# Patient Record
Sex: FEMALE | Race: WHITE | NOT HISPANIC OR LATINO
[De-identification: names, ages, dates, MRNs, and addresses within clinical notes are randomized per-mention and may not be internally consistent; named-entity substitution may affect disease eponyms.]

---

## 2021-02-27 ENCOUNTER — APPOINTMENT (OUTPATIENT)
Dept: DISASTER EMERGENCY | Facility: CLINIC | Age: 71
End: 2021-02-27

## 2021-02-27 PROBLEM — Z00.00 ENCOUNTER FOR PREVENTIVE HEALTH EXAMINATION: Status: ACTIVE | Noted: 2021-02-27

## 2021-02-28 LAB — SARS-COV-2 N GENE NPH QL NAA+PROBE: NOT DETECTED

## 2021-03-13 ENCOUNTER — APPOINTMENT (OUTPATIENT)
Dept: DISASTER EMERGENCY | Facility: CLINIC | Age: 71
End: 2021-03-13

## 2021-10-02 ENCOUNTER — APPOINTMENT (OUTPATIENT)
Dept: DISASTER EMERGENCY | Facility: CLINIC | Age: 71
End: 2021-10-02

## 2021-10-14 ENCOUNTER — OUTPATIENT (OUTPATIENT)
Dept: OUTPATIENT SERVICES | Facility: HOSPITAL | Age: 71
LOS: 1 days | End: 2021-10-14
Payer: MEDICARE

## 2021-10-14 PROCEDURE — 93010 ELECTROCARDIOGRAM REPORT: CPT

## 2021-10-24 DIAGNOSIS — Z01.818 ENCOUNTER FOR OTHER PREPROCEDURAL EXAMINATION: ICD-10-CM

## 2021-10-25 ENCOUNTER — APPOINTMENT (OUTPATIENT)
Dept: DISASTER EMERGENCY | Facility: CLINIC | Age: 71
End: 2021-10-25

## 2021-10-26 LAB — SARS-COV-2 N GENE NPH QL NAA+PROBE: NOT DETECTED

## 2021-10-28 ENCOUNTER — OUTPATIENT (OUTPATIENT)
Dept: OUTPATIENT SERVICES | Facility: HOSPITAL | Age: 71
LOS: 1 days | End: 2021-10-28

## 2022-01-11 ENCOUNTER — APPOINTMENT (OUTPATIENT)
Dept: CT IMAGING | Facility: CLINIC | Age: 72
End: 2022-01-11
Payer: MEDICARE

## 2022-01-11 PROCEDURE — 82565 ASSAY OF CREATININE: CPT | Mod: QW

## 2022-01-11 PROCEDURE — 74177 CT ABD & PELVIS W/CONTRAST: CPT | Mod: MH

## 2022-01-20 ENCOUNTER — NON-APPOINTMENT (OUTPATIENT)
Age: 72
End: 2022-01-20

## 2022-01-20 ENCOUNTER — APPOINTMENT (OUTPATIENT)
Dept: OPHTHALMOLOGY | Facility: CLINIC | Age: 72
End: 2022-01-20
Payer: MEDICARE

## 2022-01-20 PROCEDURE — 92004 COMPRE OPH EXAM NEW PT 1/>: CPT

## 2022-02-10 ENCOUNTER — APPOINTMENT (OUTPATIENT)
Dept: OPHTHALMOLOGY | Facility: CLINIC | Age: 72
End: 2022-02-10

## 2022-02-14 ENCOUNTER — APPOINTMENT (OUTPATIENT)
Dept: OPHTHALMOLOGY | Facility: CLINIC | Age: 72
End: 2022-02-14
Payer: MEDICARE

## 2022-02-14 ENCOUNTER — NON-APPOINTMENT (OUTPATIENT)
Age: 72
End: 2022-02-14

## 2022-02-14 PROCEDURE — 92134 CPTRZ OPH DX IMG PST SGM RTA: CPT

## 2022-02-14 PROCEDURE — 99214 OFFICE O/P EST MOD 30 MIN: CPT

## 2022-08-15 ENCOUNTER — NON-APPOINTMENT (OUTPATIENT)
Age: 72
End: 2022-08-15

## 2022-08-15 ENCOUNTER — APPOINTMENT (OUTPATIENT)
Dept: OPHTHALMOLOGY | Facility: CLINIC | Age: 72
End: 2022-08-15

## 2022-08-15 PROCEDURE — 99214 OFFICE O/P EST MOD 30 MIN: CPT

## 2022-08-15 PROCEDURE — 92134 CPTRZ OPH DX IMG PST SGM RTA: CPT

## 2022-12-28 ENCOUNTER — OFFICE (OUTPATIENT)
Dept: URBAN - METROPOLITAN AREA CLINIC 103 | Facility: CLINIC | Age: 72
Setting detail: OPHTHALMOLOGY
End: 2022-12-28
Payer: MEDICARE

## 2022-12-28 DIAGNOSIS — H35.363: ICD-10-CM

## 2022-12-28 DIAGNOSIS — H35.3221: ICD-10-CM

## 2022-12-28 DIAGNOSIS — H35.3231: ICD-10-CM

## 2022-12-28 DIAGNOSIS — H53.62: ICD-10-CM

## 2022-12-28 PROCEDURE — 92134 CPTRZ OPH DX IMG PST SGM RTA: CPT | Performed by: SPECIALIST

## 2022-12-28 PROCEDURE — 67028 INJECTION EYE DRUG: CPT | Performed by: SPECIALIST

## 2022-12-28 ASSESSMENT — REFRACTION_AUTOREFRACTION
OD_AXIS: 113
OS_AXIS: 071
OS_SPHERE: +1.25
OD_CYLINDER: -0.50
OD_SPHERE: PLANO
OS_CYLINDER: -0.75

## 2022-12-28 ASSESSMENT — CONFRONTATIONAL VISUAL FIELD TEST (CVF)
OS_FINDINGS: FULL
OD_FINDINGS: FULL

## 2022-12-28 ASSESSMENT — KERATOMETRY
OD_K2POWER_DIOPTERS: 44.75
OS_K2POWER_DIOPTERS: 45.00
OS_K1POWER_DIOPTERS: 45.00
OD_AXISANGLE_DEGREES: 090
OD_K1POWER_DIOPTERS: 44.75
METHOD_AUTO_MANUAL: MANUAL
OS_AXISANGLE_DEGREES: 090

## 2022-12-28 ASSESSMENT — VISUAL ACUITY
OD_BCVA: 20/70
OS_BCVA: 20/30-2

## 2022-12-28 ASSESSMENT — CORNEAL EDEMA CLINICAL DESCRIPTION: OD_CORNEALEDEMA: ABSENT

## 2022-12-28 ASSESSMENT — SPHEQUIV_DERIVED: OS_SPHEQUIV: 0.875

## 2022-12-28 ASSESSMENT — TONOMETRY
OS_IOP_MMHG: 13
OD_IOP_MMHG: 13

## 2022-12-28 ASSESSMENT — AXIALLENGTH_DERIVED: OS_AL: 22.7325

## 2023-01-25 ENCOUNTER — OFFICE (OUTPATIENT)
Dept: URBAN - METROPOLITAN AREA CLINIC 103 | Facility: CLINIC | Age: 73
Setting detail: OPHTHALMOLOGY
End: 2023-01-25
Payer: MEDICARE

## 2023-01-25 DIAGNOSIS — H35.363: ICD-10-CM

## 2023-01-25 DIAGNOSIS — H53.62: ICD-10-CM

## 2023-01-25 DIAGNOSIS — H35.3231: ICD-10-CM

## 2023-01-25 DIAGNOSIS — H43.811: ICD-10-CM

## 2023-01-25 PROCEDURE — 92235 FLUORESCEIN ANGRPH MLTIFRAME: CPT | Performed by: SPECIALIST

## 2023-01-25 PROCEDURE — 92012 INTRM OPH EXAM EST PATIENT: CPT | Performed by: SPECIALIST

## 2023-01-25 PROCEDURE — 92134 CPTRZ OPH DX IMG PST SGM RTA: CPT | Performed by: SPECIALIST

## 2023-01-25 ASSESSMENT — TONOMETRY
OS_IOP_MMHG: 14
OD_IOP_MMHG: 11

## 2023-01-25 ASSESSMENT — SPHEQUIV_DERIVED: OS_SPHEQUIV: 0.875

## 2023-01-25 ASSESSMENT — KERATOMETRY
OD_AXISANGLE_DEGREES: 090
OD_K2POWER_DIOPTERS: 44.75
OS_K2POWER_DIOPTERS: 45.00
OS_K1POWER_DIOPTERS: 45.00
METHOD_AUTO_MANUAL: MANUAL
OS_AXISANGLE_DEGREES: 090
OD_K1POWER_DIOPTERS: 44.75

## 2023-01-25 ASSESSMENT — VISUAL ACUITY
OD_BCVA: 20/60+
OS_BCVA: 20/30-

## 2023-01-25 ASSESSMENT — CONFRONTATIONAL VISUAL FIELD TEST (CVF)
OD_FINDINGS: FULL
OS_FINDINGS: FULL

## 2023-01-25 ASSESSMENT — REFRACTION_AUTOREFRACTION
OD_AXIS: 113
OS_CYLINDER: -0.75
OD_CYLINDER: -0.50
OS_SPHERE: +1.25
OS_AXIS: 071
OD_SPHERE: PLANO

## 2023-01-25 ASSESSMENT — AXIALLENGTH_DERIVED: OS_AL: 22.7325

## 2023-01-25 ASSESSMENT — CORNEAL EDEMA CLINICAL DESCRIPTION: OD_CORNEALEDEMA: ABSENT

## 2023-01-31 ENCOUNTER — NON-APPOINTMENT (OUTPATIENT)
Age: 73
End: 2023-01-31

## 2023-01-31 ENCOUNTER — APPOINTMENT (OUTPATIENT)
Dept: ENDOCRINOLOGY | Facility: CLINIC | Age: 73
End: 2023-01-31
Payer: MEDICARE

## 2023-01-31 VITALS
OXYGEN SATURATION: 97 % | WEIGHT: 180 LBS | TEMPERATURE: 97.3 F | DIASTOLIC BLOOD PRESSURE: 80 MMHG | HEART RATE: 60 BPM | HEIGHT: 64 IN | RESPIRATION RATE: 14 BRPM | BODY MASS INDEX: 30.73 KG/M2 | SYSTOLIC BLOOD PRESSURE: 130 MMHG

## 2023-01-31 DIAGNOSIS — I10 ESSENTIAL (PRIMARY) HYPERTENSION: ICD-10-CM

## 2023-01-31 DIAGNOSIS — Z87.19 PERSONAL HISTORY OF OTHER DISEASES OF THE DIGESTIVE SYSTEM: ICD-10-CM

## 2023-01-31 DIAGNOSIS — Z86.59 PERSONAL HISTORY OF OTHER MENTAL AND BEHAVIORAL DISORDERS: ICD-10-CM

## 2023-01-31 DIAGNOSIS — I48.91 UNSPECIFIED ATRIAL FIBRILLATION: ICD-10-CM

## 2023-01-31 DIAGNOSIS — Z83.49 FAMILY HISTORY OF OTHER ENDOCRINE, NUTRITIONAL AND METABOLIC DISEASES: ICD-10-CM

## 2023-01-31 DIAGNOSIS — Z86.79 PERSONAL HISTORY OF OTHER DISEASES OF THE CIRCULATORY SYSTEM: ICD-10-CM

## 2023-01-31 DIAGNOSIS — E78.5 HYPERLIPIDEMIA, UNSPECIFIED: ICD-10-CM

## 2023-01-31 PROCEDURE — 99204 OFFICE O/P NEW MOD 45 MIN: CPT

## 2023-01-31 RX ORDER — SIMVASTATIN 20 MG/1
20 TABLET, FILM COATED ORAL DAILY
Refills: 0 | Status: ACTIVE | COMMUNITY

## 2023-01-31 RX ORDER — TRIAMTERENE AND HYDROCHLOROTHIAZIDE 37.5; 25 MG/1; MG/1
37.5-25 CAPSULE ORAL DAILY
Refills: 0 | Status: ACTIVE | COMMUNITY

## 2023-01-31 RX ORDER — METOPROLOL SUCCINATE 25 MG/1
25 TABLET, EXTENDED RELEASE ORAL DAILY
Refills: 0 | Status: ACTIVE | COMMUNITY

## 2023-01-31 RX ORDER — PAROXETINE HYDROCHLORIDE 10 MG/1
10 TABLET, FILM COATED ORAL DAILY
Refills: 0 | Status: ACTIVE | COMMUNITY

## 2023-01-31 RX ORDER — DILTIAZEM HYDROCHLORIDE 120 MG/1
120 CAPSULE, COATED, EXTENDED RELEASE ORAL DAILY
Refills: 0 | Status: ACTIVE | COMMUNITY

## 2023-01-31 RX ORDER — PANTOPRAZOLE 40 MG/1
40 TABLET, DELAYED RELEASE ORAL DAILY
Refills: 0 | Status: ACTIVE | COMMUNITY

## 2023-01-31 RX ORDER — APIXABAN 5 MG/1
5 TABLET, FILM COATED ORAL TWICE DAILY
Refills: 0 | Status: ACTIVE | COMMUNITY

## 2023-01-31 NOTE — HISTORY OF PRESENT ILLNESS
[FreeTextEntry1] : Here to establish care for history of thyroid nodules and recently diagnosed subclinical hyperthyroidism.\par About 10 years ago was found to have several moderate to large sized thyroid nodules.  She had biopsies done with Dr. Holm, which were benign.  She reports that follow up imaging showed smaller nodules, but no recent imaging nodule.  \par \par PMH of Afib, preDM, HTN and HLD. \par \par About one year ago, was found to have low TSH on routine labs.  She was subsequently referred her for work up. \par She denies any symptoms palpitations, tremors or heat intolerance.  No neck pain or dysphagia.

## 2023-01-31 NOTE — ASSESSMENT
[FreeTextEntry1] : 72 year old female with PMH of multinodular goiter s/p benign FNA, Afib, HTN and HLD here to establish endocrine care.  Labs in 2022 showed slightly low TSH level, which could indicated mild subclinical hyperthyroidism versus sick euthyroid syndrome.\par \par 1.  Multinodular goiter-  check thyroid US now.  Will attempt to obtain previous imaging results and FNA results.   Will repeat TFTs now along with thyroid antibodies (including TSI).   If TSH remains suppressed, will likely need thyroid uptake and scan. \par 2.  Prediabetes-   Continue lifestyle modification.  Check A1c now.  \par \par Will determine follow up interval based on review of labs and imaging.

## 2023-01-31 NOTE — REVIEW OF SYSTEMS
[Fatigue] : fatigue [Anxiety] : anxiety [Blurred Vision] : no blurred vision [Dysphagia] : no dysphagia [Neck Pain] : no neck pain [Palpitations] : no palpitations [Shortness Of Breath] : no shortness of breath [Diarrhea] : no diarrhea [Muscle Cramps] : no muscle cramps [Hair Loss] : no hair loss [Tremors] : no tremors [Heat Intolerance] : no heat intolerance

## 2023-01-31 NOTE — PHYSICAL EXAM
[Healthy Appearance] : healthy appearance [No Acute Distress] : no acute distress [Normal Sclera/Conjunctiva] : normal sclera/conjunctiva [EOMI] : extra ocular movement intact [No Proptosis] : no proptosis [No Lid Lag] : no lid lag [No Respiratory Distress] : no respiratory distress [Clear to Auscultation] : lungs were clear to auscultation bilaterally [Normal S1, S2] : normal S1 and S2 [No Murmurs] : no murmurs [Normal Rate] : heart rate was normal [Regular Rhythm] : with a regular rhythm [No Edema] : no peripheral edema [Normal Gait] : normal gait [No Clubbing, Cyanosis] : no clubbing  or cyanosis of the fingernails [No Tremors] : no tremors [Normal Affect] : the affect was normal [Normal Insight/Judgement] : insight and judgment were intact [Normal Mood] : the mood was normal [Acanthosis Nigricans] : no acanthosis nigricans [de-identified] : Thyroid is prominent and irregular in texture.

## 2023-02-07 LAB
ALBUMIN SERPL ELPH-MCNC: 4.6 G/DL
ALP BLD-CCNC: 83 U/L
ALT SERPL-CCNC: 13 U/L
ANION GAP SERPL CALC-SCNC: 10 MMOL/L
AST SERPL-CCNC: 21 U/L
BILIRUB SERPL-MCNC: 0.8 MG/DL
BUN SERPL-MCNC: 17 MG/DL
CALCIUM SERPL-MCNC: 10.3 MG/DL
CHLORIDE SERPL-SCNC: 102 MMOL/L
CO2 SERPL-SCNC: 31 MMOL/L
CREAT SERPL-MCNC: 0.72 MG/DL
EGFR: 88 ML/MIN/1.73M2
ESTIMATED AVERAGE GLUCOSE: 120 MG/DL
GLUCOSE SERPL-MCNC: 93 MG/DL
HBA1C MFR BLD HPLC: 5.8 %
POTASSIUM SERPL-SCNC: 4.9 MMOL/L
PROT SERPL-MCNC: 6.5 G/DL
SODIUM SERPL-SCNC: 144 MMOL/L
T3FREE SERPL-MCNC: 2.68 PG/ML
T4 FREE SERPL-MCNC: 1.3 NG/DL
THYROGLOB AB SERPL-ACNC: <20 IU/ML
THYROPEROXIDASE AB SERPL IA-ACNC: <10 IU/ML
TSH SERPL-ACNC: 0.3 UIU/ML
TSI ACT/NOR SER: <0.1 IU/L

## 2023-02-22 ENCOUNTER — OFFICE (OUTPATIENT)
Dept: URBAN - METROPOLITAN AREA CLINIC 103 | Facility: CLINIC | Age: 73
Setting detail: OPHTHALMOLOGY
End: 2023-02-22
Payer: MEDICARE

## 2023-02-22 VITALS — HEIGHT: 64 IN | WEIGHT: 190 LBS

## 2023-02-22 DIAGNOSIS — H43.811: ICD-10-CM

## 2023-02-22 DIAGNOSIS — H35.363: ICD-10-CM

## 2023-02-22 DIAGNOSIS — H35.3221: ICD-10-CM

## 2023-02-22 DIAGNOSIS — H35.3212: ICD-10-CM

## 2023-02-22 PROCEDURE — 92134 CPTRZ OPH DX IMG PST SGM RTA: CPT | Performed by: SPECIALIST

## 2023-02-22 PROCEDURE — 67028 INJECTION EYE DRUG: CPT | Performed by: SPECIALIST

## 2023-02-22 ASSESSMENT — KERATOMETRY
OD_K1POWER_DIOPTERS: 44.75
OS_K2POWER_DIOPTERS: 45.00
OS_K1POWER_DIOPTERS: 45.00
OD_K2POWER_DIOPTERS: 44.75
OS_AXISANGLE_DEGREES: 090
OD_AXISANGLE_DEGREES: 090
METHOD_AUTO_MANUAL: MANUAL

## 2023-02-22 ASSESSMENT — REFRACTION_AUTOREFRACTION
OD_SPHERE: PLANO
OD_AXIS: 113
OS_SPHERE: +1.25
OS_AXIS: 071
OS_CYLINDER: -0.75
OD_CYLINDER: -0.50

## 2023-02-22 ASSESSMENT — CONFRONTATIONAL VISUAL FIELD TEST (CVF)
OD_FINDINGS: FULL
OS_FINDINGS: FULL

## 2023-02-22 ASSESSMENT — VISUAL ACUITY
OD_BCVA: 20/70-2
OS_BCVA: 20/30-

## 2023-02-22 ASSESSMENT — AXIALLENGTH_DERIVED: OS_AL: 22.7325

## 2023-02-22 ASSESSMENT — SPHEQUIV_DERIVED: OS_SPHEQUIV: 0.875

## 2023-02-22 ASSESSMENT — CORNEAL EDEMA CLINICAL DESCRIPTION: OD_CORNEALEDEMA: ABSENT

## 2023-03-22 ENCOUNTER — OFFICE (OUTPATIENT)
Dept: URBAN - METROPOLITAN AREA CLINIC 103 | Facility: CLINIC | Age: 73
Setting detail: OPHTHALMOLOGY
End: 2023-03-22
Payer: MEDICARE

## 2023-03-22 DIAGNOSIS — H43.811: ICD-10-CM

## 2023-03-22 DIAGNOSIS — H35.363: ICD-10-CM

## 2023-03-22 DIAGNOSIS — H53.62: ICD-10-CM

## 2023-03-22 DIAGNOSIS — H35.3212: ICD-10-CM

## 2023-03-22 DIAGNOSIS — H35.3221: ICD-10-CM

## 2023-03-22 PROCEDURE — 92012 INTRM OPH EXAM EST PATIENT: CPT | Performed by: SPECIALIST

## 2023-03-22 PROCEDURE — 92134 CPTRZ OPH DX IMG PST SGM RTA: CPT | Performed by: SPECIALIST

## 2023-03-22 ASSESSMENT — KERATOMETRY
OS_AXISANGLE_DEGREES: 090
OD_AXISANGLE_DEGREES: 090
METHOD_AUTO_MANUAL: MANUAL
OD_K1POWER_DIOPTERS: 44.75
OS_K2POWER_DIOPTERS: 45.00
OD_K2POWER_DIOPTERS: 44.75
OS_K1POWER_DIOPTERS: 45.00

## 2023-03-22 ASSESSMENT — TONOMETRY
OS_IOP_MMHG: 12
OD_IOP_MMHG: 16

## 2023-03-22 ASSESSMENT — REFRACTION_AUTOREFRACTION
OD_SPHERE: PLANO
OS_SPHERE: +1.25
OD_AXIS: 113
OS_CYLINDER: -0.75
OD_CYLINDER: -0.50
OS_AXIS: 071

## 2023-03-22 ASSESSMENT — VISUAL ACUITY
OD_BCVA: 20/80-1
OS_BCVA: 20/40

## 2023-03-22 ASSESSMENT — CORNEAL EDEMA CLINICAL DESCRIPTION: OD_CORNEALEDEMA: ABSENT

## 2023-03-22 ASSESSMENT — AXIALLENGTH_DERIVED: OS_AL: 22.7325

## 2023-03-22 ASSESSMENT — SPHEQUIV_DERIVED: OS_SPHEQUIV: 0.875

## 2023-03-28 ENCOUNTER — APPOINTMENT (OUTPATIENT)
Dept: ENDOCRINOLOGY | Facility: CLINIC | Age: 73
End: 2023-03-28
Payer: MEDICARE

## 2023-03-28 VITALS
DIASTOLIC BLOOD PRESSURE: 78 MMHG | BODY MASS INDEX: 31.24 KG/M2 | HEIGHT: 64 IN | WEIGHT: 183 LBS | HEART RATE: 58 BPM | OXYGEN SATURATION: 96 % | SYSTOLIC BLOOD PRESSURE: 136 MMHG

## 2023-03-28 DIAGNOSIS — R73.03 PREDIABETES.: ICD-10-CM

## 2023-03-28 PROCEDURE — 99214 OFFICE O/P EST MOD 30 MIN: CPT

## 2023-03-28 NOTE — ASSESSMENT
[FreeTextEntry1] : 72 year old female with PMH of multinodular goiter s/p benign FNA, Afib, HTN and HLD here for follow up.  Recent imaging shows enlarging goiter with new nodules causing tracheal compression.    \par \par 1.  Multinodular goiter-  Will need surgery due to tracheal effects, but will first repeat US guided FNA of new large right nodule as well as dominant existing right nodule and 2 dominant left nodules to better guide approach to surgery (i.e. Total versus Hemithyroidectomy).  Surgery was discussed in detail with patient and will refer to Dr. Kinney once cytology results are available.   \par \par 2.  Prediabetes-   Continue lifestyle modification.   \par \par

## 2023-03-28 NOTE — DATA REVIEWED
[FreeTextEntry1] : LABS:\par 4/18/2022:\par TSH 0.21\par Free T4 1.5\par A1c 5.8%\par \par Thyroid US 3/14/2023:\par R mid to lower pole 4.2 x 2.7 x 3.3 cm complex nodule (new)\par Right upper mid pole 0.9 cm complex nodule (new)\par right mid to lower pole 3.7 x 2.9 x 3.4 cm hypoechoic nodule (stable)\par left upper pole 1.1 cm complex nodule\par left lateral 1.3 cm complex nodule\par left mid pole 2.3 x 1.1 x 1.6 cm complex nodule\par Left lower pole  1.6 x 1.3 x 1.7 cm hypoechoic nodule\par left lower pole 1.1 cm complex nodule

## 2023-03-28 NOTE — PHYSICAL EXAM
[Healthy Appearance] : healthy appearance [No Acute Distress] : no acute distress [Normal Sclera/Conjunctiva] : normal sclera/conjunctiva [EOMI] : extra ocular movement intact [No Proptosis] : no proptosis [No Lid Lag] : no lid lag [No Respiratory Distress] : no respiratory distress [Clear to Auscultation] : lungs were clear to auscultation bilaterally [Normal S1, S2] : normal S1 and S2 [No Murmurs] : no murmurs [Normal Rate] : heart rate was normal [Regular Rhythm] : with a regular rhythm [No Tremors] : no tremors [Normal Affect] : the affect was normal [Normal Insight/Judgement] : insight and judgment were intact [Normal Mood] : the mood was normal [Acanthosis Nigricans] : no acanthosis nigricans [de-identified] : Prominent right thyroid lobe

## 2023-03-28 NOTE — HISTORY OF PRESENT ILLNESS
[FreeTextEntry1] : Follow up thyroid nodules.\par Labs in the past showed subclinical hyperthyroidism, although repeat labs in 1/2023 showed normalized TSH.  \par In 2009 she was found to have several moderate to large sized thyroid nodules.  \par FNA of the large right thyroid nodule was benign.  A left nodule showed atypia of undetermined significance, although patient claims this biopsy was repeated and was benign (cytology is not available from this).  \par She had a CT done in 2011 showing tracheal deviation with some luminal compromise.  \par \par A recent ultrasound done shows multiple new nodules with a right lobe measuring over 10 cm.  \par \par PMH of Afib, preDM, HTN and HLD. \par \par  She denies associated neck pain or dysphagia.   \par Does report some dyspnea on exertion and when laying down/ sleeping.

## 2023-04-03 ENCOUNTER — APPOINTMENT (OUTPATIENT)
Dept: OPHTHALMOLOGY | Facility: CLINIC | Age: 73
End: 2023-04-03
Payer: MEDICARE

## 2023-04-03 ENCOUNTER — NON-APPOINTMENT (OUTPATIENT)
Age: 73
End: 2023-04-03

## 2023-04-03 PROCEDURE — 92014 COMPRE OPH EXAM EST PT 1/>: CPT

## 2023-04-03 PROCEDURE — 92134 CPTRZ OPH DX IMG PST SGM RTA: CPT

## 2023-04-26 ENCOUNTER — OFFICE (OUTPATIENT)
Dept: URBAN - METROPOLITAN AREA CLINIC 103 | Facility: CLINIC | Age: 73
Setting detail: OPHTHALMOLOGY
End: 2023-04-26
Payer: MEDICARE

## 2023-04-26 DIAGNOSIS — H43.811: ICD-10-CM

## 2023-04-26 DIAGNOSIS — H35.3221: ICD-10-CM

## 2023-04-26 DIAGNOSIS — H35.363: ICD-10-CM

## 2023-04-26 DIAGNOSIS — H53.62: ICD-10-CM

## 2023-04-26 DIAGNOSIS — H35.3212: ICD-10-CM

## 2023-04-26 PROCEDURE — 92012 INTRM OPH EXAM EST PATIENT: CPT | Performed by: SPECIALIST

## 2023-04-26 PROCEDURE — 92134 CPTRZ OPH DX IMG PST SGM RTA: CPT | Performed by: SPECIALIST

## 2023-04-26 ASSESSMENT — KERATOMETRY
OS_K2POWER_DIOPTERS: 45.00
METHOD_AUTO_MANUAL: MANUAL
OD_K2POWER_DIOPTERS: 44.75
OD_AXISANGLE_DEGREES: 090
OS_K1POWER_DIOPTERS: 45.00
OS_AXISANGLE_DEGREES: 090
OD_K1POWER_DIOPTERS: 44.75

## 2023-04-26 ASSESSMENT — CORNEAL EDEMA CLINICAL DESCRIPTION: OD_CORNEALEDEMA: ABSENT

## 2023-04-26 ASSESSMENT — VISUAL ACUITY
OD_BCVA: 20/100
OS_BCVA: 20/30-

## 2023-04-26 ASSESSMENT — SPHEQUIV_DERIVED: OS_SPHEQUIV: 0.875

## 2023-04-26 ASSESSMENT — REFRACTION_AUTOREFRACTION
OS_SPHERE: +1.25
OS_AXIS: 071
OD_CYLINDER: -0.50
OD_SPHERE: PLANO
OD_AXIS: 113
OS_CYLINDER: -0.75

## 2023-04-26 ASSESSMENT — TONOMETRY
OD_IOP_MMHG: 11
OS_IOP_MMHG: 13

## 2023-04-26 ASSESSMENT — AXIALLENGTH_DERIVED: OS_AL: 22.7325

## 2023-07-26 ENCOUNTER — OFFICE (OUTPATIENT)
Dept: URBAN - METROPOLITAN AREA CLINIC 103 | Facility: CLINIC | Age: 73
Setting detail: OPHTHALMOLOGY
End: 2023-07-26
Payer: MEDICARE

## 2023-07-26 DIAGNOSIS — H35.363: ICD-10-CM

## 2023-07-26 DIAGNOSIS — H53.62: ICD-10-CM

## 2023-07-26 DIAGNOSIS — H35.3212: ICD-10-CM

## 2023-07-26 DIAGNOSIS — H35.3221: ICD-10-CM

## 2023-07-26 PROCEDURE — 67028 INJECTION EYE DRUG: CPT | Performed by: SPECIALIST

## 2023-07-26 PROCEDURE — 92134 CPTRZ OPH DX IMG PST SGM RTA: CPT | Performed by: SPECIALIST

## 2023-07-26 ASSESSMENT — KERATOMETRY
OS_K1POWER_DIOPTERS: 45.00
OS_K2POWER_DIOPTERS: 45.00
OD_K2POWER_DIOPTERS: 44.75
OD_AXISANGLE_DEGREES: 090
METHOD_AUTO_MANUAL: MANUAL
OS_AXISANGLE_DEGREES: 090
OD_K1POWER_DIOPTERS: 44.75

## 2023-07-26 ASSESSMENT — CORNEAL EDEMA CLINICAL DESCRIPTION: OD_CORNEALEDEMA: ABSENT

## 2023-07-26 ASSESSMENT — REFRACTION_AUTOREFRACTION
OS_AXIS: 071
OS_SPHERE: +1.25
OD_CYLINDER: -0.50
OD_AXIS: 113
OS_CYLINDER: -0.75
OD_SPHERE: PLANO

## 2023-07-26 ASSESSMENT — CONFRONTATIONAL VISUAL FIELD TEST (CVF)
OS_FINDINGS: FULL
OD_FINDINGS: FULL

## 2023-07-26 ASSESSMENT — TONOMETRY
OS_IOP_MMHG: 11
OD_IOP_MMHG: 11

## 2023-07-26 ASSESSMENT — AXIALLENGTH_DERIVED: OS_AL: 22.7325

## 2023-07-26 ASSESSMENT — VISUAL ACUITY
OD_BCVA: 20/40+
OS_BCVA: 20/30-

## 2023-07-26 ASSESSMENT — SPHEQUIV_DERIVED: OS_SPHEQUIV: 0.875

## 2023-08-29 ENCOUNTER — APPOINTMENT (OUTPATIENT)
Dept: SURGERY | Facility: CLINIC | Age: 73
End: 2023-08-29
Payer: MEDICARE

## 2023-08-29 VITALS
HEIGHT: 64 IN | DIASTOLIC BLOOD PRESSURE: 80 MMHG | BODY MASS INDEX: 32.44 KG/M2 | WEIGHT: 190 LBS | SYSTOLIC BLOOD PRESSURE: 128 MMHG | HEART RATE: 56 BPM

## 2023-08-29 PROCEDURE — 31575 DIAGNOSTIC LARYNGOSCOPY: CPT

## 2023-08-29 PROCEDURE — 99204 OFFICE O/P NEW MOD 45 MIN: CPT | Mod: 25

## 2023-08-30 NOTE — PHYSICAL EXAM
[de-identified] : 7 cm right thyroid nodule with substernal extension, 2 cm left thyroid nodule,, all well circumscribed and mobile [Nasal Endoscopy Performed] : nasal endoscopy was performed, see procedure section for findings [Laryngoscopy Performed] : laryngoscopy was performed, see procedure section for findings [L] : deviated to the left [Normal] : orientation to person, place, and time: normal [de-identified] : fiberoptic laryngoscopy shows normal vocal cord mobility bilaterally with no lesions noted

## 2023-08-30 NOTE — ASSESSMENT
[FreeTextEntry1] : lengthy discussion regarding options for management including active surveillance  vs thyroid lobectomy with possible paratracheal node dissection, with or without frozen section vs total thyroidectomy with possible paratracheal node dissection.  risks, benefits and alternatives discussed at length.  I have discussed with the patient the anatomy of the area, the pathophysiology of the disease process and the rationale for surgery.  The attendant risks, possible complications and expected postoperative course have been discussed in detail.  I have given the patient the opportunity to ask questions,.  would require recurrent nerve monitoring and to suspend Eliquis if requires surgery.  will review CT.  for repeat biopsy left thyroid nodule.  to call next week for results.

## 2023-08-30 NOTE — HISTORY OF PRESENT ILLNESS
[de-identified] : 15 year history of thyroid enlargement.  notes SOB.  slight voice change. denies dysphagia or RT exposure. normal TFTs, calcium 10.3.  sonogram shows 4.7 and 3.7 cm right and multiple left thyroid nodules up to 2.3 cm.  CT shows enlarged thyroid, R > L with substernal extension. FNA right nodule benign, left non diagnostic.  I have reviewed all old and new data and available images Additional information was obtained from others present at the time of visit to ensure the completeness of the history

## 2023-08-30 NOTE — CONSULT LETTER
[Dear  ___] : Dear  [unfilled], [Consult Letter:] : I had the pleasure of evaluating your patient, [unfilled]. [Please see my note below.] : Please see my note below. [Consult Closing:] : Thank you very much for allowing me to participate in the care of this patient.  If you have any questions, please do not hesitate to contact me. [Sincerely,] : Sincerely, [FreeTextEntry2] : Dr. Jose Del Cid, Dr. Ben Rodriguez [FreeTextEntry3] : Gabriel Kniney MD, FACS System Director, Endocrine Surgery [facility] Associate  Professor of Surgery [employer] at [facility] [DrPaxton  ___] : Dr. CM

## 2023-09-27 ENCOUNTER — OFFICE (OUTPATIENT)
Dept: URBAN - METROPOLITAN AREA CLINIC 103 | Facility: CLINIC | Age: 73
Setting detail: OPHTHALMOLOGY
End: 2023-09-27
Payer: MEDICARE

## 2023-09-27 DIAGNOSIS — H35.363: ICD-10-CM

## 2023-09-27 DIAGNOSIS — H53.62: ICD-10-CM

## 2023-09-27 DIAGNOSIS — H43.811: ICD-10-CM

## 2023-09-27 DIAGNOSIS — H35.3221: ICD-10-CM

## 2023-09-27 DIAGNOSIS — H35.3212: ICD-10-CM

## 2023-09-27 PROCEDURE — 92134 CPTRZ OPH DX IMG PST SGM RTA: CPT | Performed by: SPECIALIST

## 2023-09-27 PROCEDURE — 92012 INTRM OPH EXAM EST PATIENT: CPT | Performed by: SPECIALIST

## 2023-09-27 ASSESSMENT — SPHEQUIV_DERIVED: OS_SPHEQUIV: 0.875

## 2023-09-27 ASSESSMENT — REFRACTION_AUTOREFRACTION
OD_AXIS: 113
OS_CYLINDER: -0.75
OS_AXIS: 071
OD_CYLINDER: -0.50
OD_SPHERE: PLANO
OS_SPHERE: +1.25

## 2023-09-27 ASSESSMENT — KERATOMETRY
OS_K1POWER_DIOPTERS: 45.00
OD_AXISANGLE_DEGREES: 090
OS_AXISANGLE_DEGREES: 090
METHOD_AUTO_MANUAL: MANUAL
OD_K1POWER_DIOPTERS: 44.75
OS_K2POWER_DIOPTERS: 45.00
OD_K2POWER_DIOPTERS: 44.75

## 2023-09-27 ASSESSMENT — AXIALLENGTH_DERIVED: OS_AL: 22.7325

## 2023-09-27 ASSESSMENT — TONOMETRY
OS_IOP_MMHG: 11
OD_IOP_MMHG: 12

## 2023-09-27 ASSESSMENT — CORNEAL EDEMA CLINICAL DESCRIPTION: OD_CORNEALEDEMA: ABSENT

## 2023-09-27 ASSESSMENT — VISUAL ACUITY
OS_BCVA: 20/40
OD_BCVA: 20/30+1

## 2023-10-02 ENCOUNTER — RESULT REVIEW (OUTPATIENT)
Age: 73
End: 2023-10-02

## 2023-10-09 ENCOUNTER — APPOINTMENT (OUTPATIENT)
Dept: OPHTHALMOLOGY | Facility: CLINIC | Age: 73
End: 2023-10-09

## 2023-10-12 DIAGNOSIS — E04.2 NONTOXIC MULTINODULAR GOITER: ICD-10-CM

## 2023-11-17 ENCOUNTER — NON-APPOINTMENT (OUTPATIENT)
Age: 73
End: 2023-11-17

## 2023-11-20 ENCOUNTER — APPOINTMENT (OUTPATIENT)
Dept: OPHTHALMOLOGY | Facility: CLINIC | Age: 73
End: 2023-11-20
Payer: MEDICARE

## 2023-11-20 ENCOUNTER — NON-APPOINTMENT (OUTPATIENT)
Age: 73
End: 2023-11-20

## 2023-11-20 PROCEDURE — 92014 COMPRE OPH EXAM EST PT 1/>: CPT

## 2023-11-20 PROCEDURE — 92134 CPTRZ OPH DX IMG PST SGM RTA: CPT

## 2023-11-22 ENCOUNTER — OFFICE (OUTPATIENT)
Dept: URBAN - METROPOLITAN AREA CLINIC 103 | Facility: CLINIC | Age: 73
Setting detail: OPHTHALMOLOGY
End: 2023-11-22
Payer: MEDICARE

## 2023-11-22 DIAGNOSIS — H35.3221: ICD-10-CM

## 2023-11-22 DIAGNOSIS — H35.363: ICD-10-CM

## 2023-11-22 DIAGNOSIS — H35.3212: ICD-10-CM

## 2023-11-22 PROCEDURE — 92134 CPTRZ OPH DX IMG PST SGM RTA: CPT | Performed by: SPECIALIST

## 2023-11-22 PROCEDURE — 92235 FLUORESCEIN ANGRPH MLTIFRAME: CPT | Performed by: SPECIALIST

## 2023-11-22 PROCEDURE — 67028 INJECTION EYE DRUG: CPT | Mod: LT | Performed by: SPECIALIST

## 2023-11-22 ASSESSMENT — CONFRONTATIONAL VISUAL FIELD TEST (CVF)
OS_FINDINGS: FULL
OD_FINDINGS: FULL

## 2023-11-22 ASSESSMENT — REFRACTION_AUTOREFRACTION
OD_CYLINDER: -0.50
OS_SPHERE: +1.25
OS_AXIS: 071
OD_AXIS: 113
OS_CYLINDER: -0.75
OD_SPHERE: PLANO

## 2023-11-22 ASSESSMENT — SPHEQUIV_DERIVED: OS_SPHEQUIV: 0.875

## 2023-11-22 ASSESSMENT — CORNEAL EDEMA CLINICAL DESCRIPTION: OD_CORNEALEDEMA: ABSENT

## 2024-01-24 ENCOUNTER — OFFICE (OUTPATIENT)
Dept: URBAN - METROPOLITAN AREA CLINIC 103 | Facility: CLINIC | Age: 74
Setting detail: OPHTHALMOLOGY
End: 2024-01-24
Payer: MEDICARE

## 2024-01-24 DIAGNOSIS — H35.3212: ICD-10-CM

## 2024-01-24 DIAGNOSIS — H53.62: ICD-10-CM

## 2024-01-24 DIAGNOSIS — H35.3221: ICD-10-CM

## 2024-01-24 DIAGNOSIS — H43.811: ICD-10-CM

## 2024-01-24 DIAGNOSIS — H35.363: ICD-10-CM

## 2024-01-24 PROCEDURE — 92012 INTRM OPH EXAM EST PATIENT: CPT | Performed by: SPECIALIST

## 2024-01-24 PROCEDURE — 92134 CPTRZ OPH DX IMG PST SGM RTA: CPT | Performed by: SPECIALIST

## 2024-01-24 ASSESSMENT — CORNEAL EDEMA CLINICAL DESCRIPTION: OD_CORNEALEDEMA: ABSENT

## 2024-01-24 ASSESSMENT — REFRACTION_AUTOREFRACTION
OD_SPHERE: PLANO
OS_CYLINDER: -0.75
OD_CYLINDER: -0.50
OS_SPHERE: +1.25
OS_AXIS: 071
OD_AXIS: 113

## 2024-01-24 ASSESSMENT — SPHEQUIV_DERIVED: OS_SPHEQUIV: 0.875

## 2024-02-28 ENCOUNTER — OFFICE (OUTPATIENT)
Dept: URBAN - METROPOLITAN AREA CLINIC 103 | Facility: CLINIC | Age: 74
Setting detail: OPHTHALMOLOGY
End: 2024-02-28
Payer: MEDICARE

## 2024-02-28 DIAGNOSIS — H35.363: ICD-10-CM

## 2024-02-28 DIAGNOSIS — H35.3212: ICD-10-CM

## 2024-02-28 PROCEDURE — 67028 INJECTION EYE DRUG: CPT | Mod: RT | Performed by: SPECIALIST

## 2024-02-28 PROCEDURE — 92134 CPTRZ OPH DX IMG PST SGM RTA: CPT | Performed by: SPECIALIST

## 2024-02-28 ASSESSMENT — REFRACTION_AUTOREFRACTION
OD_CYLINDER: -0.50
OS_CYLINDER: -0.75
OD_AXIS: 113
OS_AXIS: 071
OS_SPHERE: +1.25
OD_SPHERE: PLANO

## 2024-02-28 ASSESSMENT — CORNEAL EDEMA CLINICAL DESCRIPTION: OD_CORNEALEDEMA: ABSENT

## 2024-02-28 ASSESSMENT — SPHEQUIV_DERIVED: OS_SPHEQUIV: 0.875

## 2024-02-28 ASSESSMENT — CONFRONTATIONAL VISUAL FIELD TEST (CVF)
OD_FINDINGS: FULL
OS_FINDINGS: FULL

## 2024-04-24 ENCOUNTER — OFFICE (OUTPATIENT)
Dept: URBAN - METROPOLITAN AREA CLINIC 103 | Facility: CLINIC | Age: 74
Setting detail: OPHTHALMOLOGY
End: 2024-04-24
Payer: MEDICARE

## 2024-04-24 DIAGNOSIS — H35.3221: ICD-10-CM

## 2024-04-24 DIAGNOSIS — H35.363: ICD-10-CM

## 2024-04-24 DIAGNOSIS — H43.811: ICD-10-CM

## 2024-04-24 DIAGNOSIS — H53.62: ICD-10-CM

## 2024-04-24 DIAGNOSIS — H35.3212: ICD-10-CM

## 2024-04-24 PROCEDURE — 92134 CPTRZ OPH DX IMG PST SGM RTA: CPT | Performed by: SPECIALIST

## 2024-04-24 PROCEDURE — 92012 INTRM OPH EXAM EST PATIENT: CPT | Performed by: SPECIALIST

## 2024-05-06 ENCOUNTER — APPOINTMENT (OUTPATIENT)
Dept: OPHTHALMOLOGY | Facility: CLINIC | Age: 74
End: 2024-05-06
Payer: SELF-PAY

## 2024-05-06 ENCOUNTER — APPOINTMENT (OUTPATIENT)
Dept: OPHTHALMOLOGY | Facility: CLINIC | Age: 74
End: 2024-05-06
Payer: MEDICARE

## 2024-05-06 ENCOUNTER — NON-APPOINTMENT (OUTPATIENT)
Age: 74
End: 2024-05-06

## 2024-05-06 PROCEDURE — 92014 COMPRE OPH EXAM EST PT 1/>: CPT

## 2024-05-06 PROCEDURE — 92015 DETERMINE REFRACTIVE STATE: CPT

## 2024-05-06 PROCEDURE — 92134 CPTRZ OPH DX IMG PST SGM RTA: CPT

## 2024-05-22 ENCOUNTER — OFFICE (OUTPATIENT)
Dept: URBAN - METROPOLITAN AREA CLINIC 103 | Facility: CLINIC | Age: 74
Setting detail: OPHTHALMOLOGY
End: 2024-05-22
Payer: MEDICARE

## 2024-05-22 DIAGNOSIS — H53.62: ICD-10-CM

## 2024-05-22 DIAGNOSIS — H43.811: ICD-10-CM

## 2024-05-22 DIAGNOSIS — H35.3212: ICD-10-CM

## 2024-05-22 DIAGNOSIS — H35.363: ICD-10-CM

## 2024-05-22 DIAGNOSIS — H35.3221: ICD-10-CM

## 2024-05-22 PROCEDURE — 92014 COMPRE OPH EXAM EST PT 1/>: CPT | Performed by: SPECIALIST

## 2024-05-22 PROCEDURE — 92134 CPTRZ OPH DX IMG PST SGM RTA: CPT | Performed by: SPECIALIST

## 2024-05-22 ASSESSMENT — CONFRONTATIONAL VISUAL FIELD TEST (CVF)
OD_FINDINGS: FULL
OS_FINDINGS: FULL

## 2024-09-25 ENCOUNTER — OFFICE (OUTPATIENT)
Dept: URBAN - METROPOLITAN AREA CLINIC 103 | Facility: CLINIC | Age: 74
Setting detail: OPHTHALMOLOGY
End: 2024-09-25
Payer: MEDICARE

## 2024-09-25 DIAGNOSIS — H35.3212: ICD-10-CM

## 2024-09-25 DIAGNOSIS — H43.811: ICD-10-CM

## 2024-09-25 DIAGNOSIS — H53.62: ICD-10-CM

## 2024-09-25 PROCEDURE — 92014 COMPRE OPH EXAM EST PT 1/>: CPT | Performed by: SPECIALIST

## 2024-09-25 PROCEDURE — 92134 CPTRZ OPH DX IMG PST SGM RTA: CPT | Performed by: SPECIALIST

## 2024-09-25 PROCEDURE — 92235 FLUORESCEIN ANGRPH MLTIFRAME: CPT | Performed by: SPECIALIST

## 2024-09-25 ASSESSMENT — CONFRONTATIONAL VISUAL FIELD TEST (CVF)
OD_FINDINGS: FULL
OS_FINDINGS: FULL

## 2024-11-11 ENCOUNTER — APPOINTMENT (OUTPATIENT)
Dept: OPHTHALMOLOGY | Facility: CLINIC | Age: 74
End: 2024-11-11
Payer: MEDICARE

## 2024-11-11 ENCOUNTER — NON-APPOINTMENT (OUTPATIENT)
Age: 74
End: 2024-11-11

## 2024-11-11 PROCEDURE — 92014 COMPRE OPH EXAM EST PT 1/>: CPT

## 2024-11-11 PROCEDURE — 92134 CPTRZ OPH DX IMG PST SGM RTA: CPT

## 2024-11-27 ENCOUNTER — OFFICE (OUTPATIENT)
Dept: URBAN - METROPOLITAN AREA CLINIC 103 | Facility: CLINIC | Age: 74
Setting detail: OPHTHALMOLOGY
End: 2024-11-27
Payer: MEDICARE

## 2024-11-27 DIAGNOSIS — H35.3212: ICD-10-CM

## 2024-11-27 DIAGNOSIS — H53.62: ICD-10-CM

## 2024-11-27 DIAGNOSIS — H35.363: ICD-10-CM

## 2024-11-27 DIAGNOSIS — H35.3221: ICD-10-CM

## 2024-11-27 DIAGNOSIS — H43.811: ICD-10-CM

## 2024-11-27 PROCEDURE — 92134 CPTRZ OPH DX IMG PST SGM RTA: CPT | Performed by: SPECIALIST

## 2024-11-27 PROCEDURE — 92012 INTRM OPH EXAM EST PATIENT: CPT | Performed by: SPECIALIST

## 2024-11-27 ASSESSMENT — KERATOMETRY
OS_K1POWER_DIOPTERS: 45.00
OS_AXISANGLE_DEGREES: 090
METHOD_AUTO_MANUAL: MANUAL
OD_K2POWER_DIOPTERS: 44.75
OS_K2POWER_DIOPTERS: 45.00
OD_K1POWER_DIOPTERS: 44.75
OD_AXISANGLE_DEGREES: 090

## 2024-11-27 ASSESSMENT — CONFRONTATIONAL VISUAL FIELD TEST (CVF)
OD_FINDINGS: FULL
OS_FINDINGS: FULL

## 2024-11-27 ASSESSMENT — REFRACTION_AUTOREFRACTION
OD_CYLINDER: -0.50
OD_AXIS: 113
OD_SPHERE: PLANO
OS_CYLINDER: -0.75
OS_SPHERE: +1.25
OS_AXIS: 071

## 2024-11-27 ASSESSMENT — TONOMETRY
OS_IOP_MMHG: 15
OD_IOP_MMHG: 13

## 2024-11-27 ASSESSMENT — CORNEAL EDEMA CLINICAL DESCRIPTION: OD_CORNEALEDEMA: ABSENT

## 2024-11-27 ASSESSMENT — VISUAL ACUITY
OD_BCVA: 20/30-1
OS_BCVA: 20/30-2

## 2024-12-13 ENCOUNTER — APPOINTMENT (OUTPATIENT)
Dept: ENDOCRINOLOGY | Facility: CLINIC | Age: 74
End: 2024-12-13
Payer: MEDICARE

## 2024-12-13 VITALS
WEIGHT: 211 LBS | SYSTOLIC BLOOD PRESSURE: 116 MMHG | RESPIRATION RATE: 16 BRPM | DIASTOLIC BLOOD PRESSURE: 68 MMHG | BODY MASS INDEX: 36.02 KG/M2 | HEIGHT: 64 IN

## 2024-12-13 DIAGNOSIS — R73.03 PREDIABETES.: ICD-10-CM

## 2024-12-13 DIAGNOSIS — E04.2 NONTOXIC MULTINODULAR GOITER: ICD-10-CM

## 2024-12-13 PROCEDURE — 99214 OFFICE O/P EST MOD 30 MIN: CPT

## 2025-01-24 ENCOUNTER — APPOINTMENT (OUTPATIENT)
Dept: ULTRASOUND IMAGING | Facility: CLINIC | Age: 75
End: 2025-01-24

## 2025-01-24 ENCOUNTER — APPOINTMENT (OUTPATIENT)
Dept: CT IMAGING | Facility: CLINIC | Age: 75
End: 2025-01-24
Payer: MEDICARE

## 2025-01-24 PROCEDURE — 70492 CT SFT TSUE NCK W/O & W/DYE: CPT

## 2025-01-24 PROCEDURE — 76536 US EXAM OF HEAD AND NECK: CPT

## 2025-02-05 ENCOUNTER — NON-APPOINTMENT (OUTPATIENT)
Age: 75
End: 2025-02-05

## 2025-02-25 ENCOUNTER — APPOINTMENT (OUTPATIENT)
Dept: SURGERY | Facility: CLINIC | Age: 75
End: 2025-02-25
Payer: MEDICARE

## 2025-02-25 DIAGNOSIS — E04.2 NONTOXIC MULTINODULAR GOITER: ICD-10-CM

## 2025-02-25 PROCEDURE — 31575 DIAGNOSTIC LARYNGOSCOPY: CPT

## 2025-02-25 PROCEDURE — 99214 OFFICE O/P EST MOD 30 MIN: CPT | Mod: 25

## 2025-03-19 ENCOUNTER — NON-APPOINTMENT (OUTPATIENT)
Age: 75
End: 2025-03-19

## 2025-03-19 DIAGNOSIS — E04.2 NONTOXIC MULTINODULAR GOITER: ICD-10-CM

## 2025-06-02 ENCOUNTER — NON-APPOINTMENT (OUTPATIENT)
Age: 75
End: 2025-06-02

## 2025-06-03 ENCOUNTER — NON-APPOINTMENT (OUTPATIENT)
Age: 75
End: 2025-06-03

## 2025-06-04 ENCOUNTER — APPOINTMENT (OUTPATIENT)
Dept: OPHTHALMOLOGY | Facility: CLINIC | Age: 75
End: 2025-06-04
Payer: MEDICARE

## 2025-06-04 ENCOUNTER — NON-APPOINTMENT (OUTPATIENT)
Age: 75
End: 2025-06-04

## 2025-06-04 PROCEDURE — 92014 COMPRE OPH EXAM EST PT 1/>: CPT

## 2025-06-04 PROCEDURE — 92134 CPTRZ OPH DX IMG PST SGM RTA: CPT

## 2025-06-11 ENCOUNTER — APPOINTMENT (OUTPATIENT)
Dept: CT IMAGING | Facility: CLINIC | Age: 75
End: 2025-06-11
Payer: MEDICARE

## 2025-06-11 ENCOUNTER — APPOINTMENT (OUTPATIENT)
Dept: ENDOCRINOLOGY | Facility: CLINIC | Age: 75
End: 2025-06-11

## 2025-06-11 PROCEDURE — 71250 CT THORAX DX C-: CPT

## 2025-08-15 DIAGNOSIS — E04.2 NONTOXIC MULTINODULAR GOITER: ICD-10-CM

## 2025-09-18 ENCOUNTER — NON-APPOINTMENT (OUTPATIENT)
Age: 75
End: 2025-09-18